# Patient Record
(demographics unavailable — no encounter records)

---

## 2024-12-19 NOTE — REVIEW OF SYSTEMS
[Fatigue] : fatigue [Abn Vaginal bleeding] : abnormal vaginal bleeding [Pelvic pain] : pelvic pain [Negative] : Heme/Lymph

## 2024-12-19 NOTE — PHYSICAL EXAM
[Chaperone Present] : A chaperone was present in the examining room during all aspects of the physical examination [FreeTextEntry2] : Eugenia Connell [Appropriately responsive] : appropriately responsive [Alert] : alert [No Acute Distress] : no acute distress [No Lymphadenopathy] : no lymphadenopathy [Soft] : soft [Non-tender] : non-tender [Non-distended] : non-distended [No HSM] : No HSM [No Lesions] : no lesions [No Mass] : no mass [Oriented x3] : oriented x3 [Labia Majora] : normal [Labia Minora] : normal [Moderate] : There was moderate vaginal bleeding [Normal] : normal [Uterine Adnexae] : non-palpable

## 2024-12-19 NOTE — HISTORY OF PRESENT ILLNESS
[FreeTextEntry1] : Dx PCOS age 17--wt loss and thenm menses m,ontly 2020--dad and GM passed away .Wt gain and menses irregular Today AUB--heavy with clots and dysmen [Previously active] : previously active [Men] : men

## 2024-12-19 NOTE — PLAN
[FreeTextEntry1] : rev TVS Differential diagnosis, work up ,management and evaluation of above mentioned concerns extensively reviewed Extensive review of above mentioned concerns. All questions and concerns addressed, patient expressed understanding. Encouraged to contact the office with any questions or concerns. pt to start po progestin Needs pap and poss endom sampling--high risk EH/CA-PCOS,BMI 39

## 2025-01-07 NOTE — HISTORY OF PRESENT ILLNESS
[FreeTextEntry1] : PCOS with AUB --back to po progestin TID--so unable to do pap today Pt would like to try Nuvaring rather than Mirena--pt to work on wt loss so as to mitiggate elevated BP as well as menstrual disorder--as it did when age 17

## 2025-01-07 NOTE — PLAN
[FreeTextEntry1] : Discussed hormonal options.  Pt desires Nuvaring.  Discussed R/B/A,side effects,compls and use of OCPS.  Rx sent to pharmacy.  Denies family or personal h/o VTE or personal h/o migraines.Pt aware inc risk MI,CVA,VTE Extensive review of above mentioned concerns. All questions and concerns addressed, patient expressed understanding. Encouraged to contact the office with any questions or concerns.

## 2025-02-05 NOTE — PLAN
[FreeTextEntry1] : Differential diagnosis, work up ,management and evaluation of above mentioned concerns extensively reviewed Extensive review of above mentioned concerns.. All questions and concerns addressed, patient expressed understanding. Encouraged to contact the office with any questions or concerns.

## 2025-02-05 NOTE — PHYSICAL EXAM
[Appropriately responsive] : appropriately responsive [Alert] : alert [No Acute Distress] : no acute distress [No Lymphadenopathy] : no lymphadenopathy [Soft] : soft [Non-tender] : non-tender [Non-distended] : non-distended [No HSM] : No HSM [No Lesions] : no lesions [No Mass] : no mass [Oriented x3] : oriented x3 [FreeTextEntry2] : See VS in EMR [Examination Of The Breasts] : a normal appearance [No Masses] : no breast masses were palpable [Labia Majora] : normal [Labia Minora] : normal [Discharge] : a  ~M vaginal discharge was present [Scant] : scant [Foul Smelling] : not foul smelling [White] : white [Thin] : thin [Normal] : normal [Uterine Adnexae] : non-palpable

## 2025-02-12 NOTE — ASSESSMENT
[FreeTextEntry1] : Health Care Maintenance - well visit - routine labs ordered - depression screen negative - pap smear 2/2025 - flu vaccine- reports 9/2024 - covid vaccines- reports 3 doses  - tdap <10 years  - advised to get annual eye exams with optometry/ophthalmology, skin exams with dermatology, and dental exams - RTC for CPE in 1 year or sooner prn  PCOS -follows with gyn Dr. Hooks

## 2025-02-12 NOTE — HEALTH RISK ASSESSMENT
[No] : In the past 12 months have you used drugs other than those required for medical reasons? No [0] : 2) Feeling down, depressed, or hopeless: Not at all (0) [PHQ-2 Negative - No further assessment needed] : PHQ-2 Negative - No further assessment needed [Employed] : employed [Single] : single [Feels Safe at Home] : Feels safe at home [Fully functional (bathing, dressing, toileting, transferring, walking, feeding)] : Fully functional (bathing, dressing, toileting, transferring, walking, feeding) [Fully functional (using the telephone, shopping, preparing meals, housekeeping, doing laundry, using] : Fully functional and needs no help or supervision to perform IADLs (using the telephone, shopping, preparing meals, housekeeping, doing laundry, using transportation, managing medications and managing finances) [Never] : Never [[id number]] : 0 [Sexually Active] : not sexually active [PapSmearDate] : 02/25 [de-identified] : mom, brother  [FreeTextEntry2] : rn at on/gyn office

## 2025-02-12 NOTE — HISTORY OF PRESENT ILLNESS
[FreeTextEntry1] : cpe/est care no pcp in approx 6 years  [de-identified] : SHARRON ALMENDAREZ is a 26 year F who presents for CPE/est care PMHx PCOS Feels well overall Denies recent surgeries or hospitalizations. Gyn Dr. Hooks

## 2025-02-12 NOTE — PAST MEDICAL HISTORY
[Menstruating] : menstruating [Definite ___ (Date)] : the last menstrual period was [unfilled] [Irregular Cycle Intervals] : are  irregular [FreeTextEntry1] : hs of PCOS